# Patient Record
Sex: FEMALE | Race: WHITE | Employment: OTHER | URBAN - METROPOLITAN AREA
[De-identification: names, ages, dates, MRNs, and addresses within clinical notes are randomized per-mention and may not be internally consistent; named-entity substitution may affect disease eponyms.]

---

## 2023-08-31 ENCOUNTER — APPOINTMENT (OUTPATIENT)
Dept: CT IMAGING | Age: 88
End: 2023-08-31
Payer: MEDICARE

## 2023-08-31 ENCOUNTER — HOSPITAL ENCOUNTER (EMERGENCY)
Age: 88
Discharge: HOME | End: 2023-08-31
Attending: EMERGENCY MEDICINE
Payer: MEDICARE

## 2023-08-31 VITALS
OXYGEN SATURATION: 97 % | WEIGHT: 131 LBS | SYSTOLIC BLOOD PRESSURE: 163 MMHG | DIASTOLIC BLOOD PRESSURE: 88 MMHG | RESPIRATION RATE: 16 BRPM | HEART RATE: 84 BPM | TEMPERATURE: 98 F

## 2023-08-31 DIAGNOSIS — H81.10 BENIGN PAROXYSMAL POSITIONAL VERTIGO, UNSPECIFIED LATERALITY: Primary | ICD-10-CM

## 2023-08-31 DIAGNOSIS — H61.21 IMPACTED CERUMEN OF RIGHT EAR: ICD-10-CM

## 2023-08-31 LAB
ANION GAP SERPL CALCULATED.3IONS-SCNC: 14 MMOL/L (ref 4–16)
BASOPHILS ABSOLUTE: 0.1 K/CU MM
BASOPHILS RELATIVE PERCENT: 1.2 % (ref 0–1)
BUN SERPL-MCNC: 13 MG/DL (ref 6–23)
CALCIUM SERPL-MCNC: 9.4 MG/DL (ref 8.3–10.6)
CHLORIDE BLD-SCNC: 94 MMOL/L (ref 99–110)
CO2: 25 MMOL/L (ref 21–32)
CREAT SERPL-MCNC: 0.6 MG/DL (ref 0.6–1.1)
DIFFERENTIAL TYPE: ABNORMAL
EOSINOPHILS ABSOLUTE: 0.1 K/CU MM
EOSINOPHILS RELATIVE PERCENT: 0.6 % (ref 0–3)
GFR SERPL CREATININE-BSD FRML MDRD: >60 ML/MIN/1.73M2
GLUCOSE SERPL-MCNC: 102 MG/DL (ref 70–99)
HCT VFR BLD CALC: 40.3 % (ref 37–47)
HEMOGLOBIN: 13.7 GM/DL (ref 12.5–16)
IMMATURE NEUTROPHIL %: 0.4 % (ref 0–0.43)
LYMPHOCYTES ABSOLUTE: 1 K/CU MM
LYMPHOCYTES RELATIVE PERCENT: 12.7 % (ref 24–44)
MCH RBC QN AUTO: 30.6 PG (ref 27–31)
MCHC RBC AUTO-ENTMCNC: 34 % (ref 32–36)
MCV RBC AUTO: 90 FL (ref 78–100)
MONOCYTES ABSOLUTE: 0.7 K/CU MM
MONOCYTES RELATIVE PERCENT: 8.3 % (ref 0–4)
PDW BLD-RTO: 13.8 % (ref 11.7–14.9)
PLATELET # BLD: 305 K/CU MM (ref 140–440)
PMV BLD AUTO: 8.5 FL (ref 7.5–11.1)
POTASSIUM SERPL-SCNC: 3.3 MMOL/L (ref 3.5–5.1)
RBC # BLD: 4.48 M/CU MM (ref 4.2–5.4)
SEGMENTED NEUTROPHILS ABSOLUTE COUNT: 6 K/CU MM
SEGMENTED NEUTROPHILS RELATIVE PERCENT: 76.8 % (ref 36–66)
SODIUM BLD-SCNC: 133 MMOL/L (ref 135–145)
TOTAL IMMATURE NEUTOROPHIL: 0.03 K/CU MM
WBC # BLD: 7.8 K/CU MM (ref 4–10.5)

## 2023-08-31 PROCEDURE — 85025 COMPLETE CBC W/AUTO DIFF WBC: CPT

## 2023-08-31 PROCEDURE — 70498 CT ANGIOGRAPHY NECK: CPT

## 2023-08-31 PROCEDURE — 2580000003 HC RX 258: Performed by: EMERGENCY MEDICINE

## 2023-08-31 PROCEDURE — 70450 CT HEAD/BRAIN W/O DYE: CPT

## 2023-08-31 PROCEDURE — 99285 EMERGENCY DEPT VISIT HI MDM: CPT

## 2023-08-31 PROCEDURE — 80048 BASIC METABOLIC PNL TOTAL CA: CPT

## 2023-08-31 PROCEDURE — 6370000000 HC RX 637 (ALT 250 FOR IP): Performed by: STUDENT IN AN ORGANIZED HEALTH CARE EDUCATION/TRAINING PROGRAM

## 2023-08-31 PROCEDURE — 93005 ELECTROCARDIOGRAM TRACING: CPT | Performed by: EMERGENCY MEDICINE

## 2023-08-31 PROCEDURE — 6360000004 HC RX CONTRAST MEDICATION: Performed by: EMERGENCY MEDICINE

## 2023-08-31 PROCEDURE — 69209 REMOVE IMPACTED EAR WAX UNI: CPT

## 2023-08-31 PROCEDURE — 6370000000 HC RX 637 (ALT 250 FOR IP): Performed by: EMERGENCY MEDICINE

## 2023-08-31 RX ORDER — HYDROCHLOROTHIAZIDE 25 MG/1
25 TABLET ORAL DAILY
COMMUNITY

## 2023-08-31 RX ORDER — POTASSIUM CHLORIDE 750 MG/1
40 TABLET, FILM COATED, EXTENDED RELEASE ORAL ONCE
Status: COMPLETED | OUTPATIENT
Start: 2023-08-31 | End: 2023-08-31

## 2023-08-31 RX ORDER — ATORVASTATIN CALCIUM 40 MG/1
40 TABLET, FILM COATED ORAL DAILY
COMMUNITY

## 2023-08-31 RX ORDER — DILTIAZEM HYDROCHLORIDE EXTENDED-RELEASE TABLETS 300 MG/1
TABLET, EXTENDED RELEASE ORAL
COMMUNITY

## 2023-08-31 RX ORDER — MECLIZINE HYDROCHLORIDE 25 MG/1
25 TABLET ORAL 3 TIMES DAILY PRN
Qty: 15 TABLET | Refills: 0 | Status: SHIPPED | OUTPATIENT
Start: 2023-08-31 | End: 2023-09-07

## 2023-08-31 RX ORDER — SODIUM CHLORIDE 0.9 % (FLUSH) 0.9 %
20 SYRINGE (ML) INJECTION
Status: COMPLETED | OUTPATIENT
Start: 2023-08-31 | End: 2023-08-31

## 2023-08-31 RX ADMIN — POTASSIUM CHLORIDE 40 MEQ: 750 TABLET, FILM COATED, EXTENDED RELEASE ORAL at 19:22

## 2023-08-31 RX ADMIN — IOPAMIDOL 100 ML: 755 INJECTION, SOLUTION INTRAVENOUS at 18:51

## 2023-08-31 RX ADMIN — SODIUM CHLORIDE, PRESERVATIVE FREE 20 ML: 5 INJECTION INTRAVENOUS at 18:52

## 2023-08-31 RX ADMIN — Medication 5 DROP: at 20:13

## 2023-08-31 ASSESSMENT — LIFESTYLE VARIABLES
HOW OFTEN DO YOU HAVE A DRINK CONTAINING ALCOHOL: NEVER
HOW MANY STANDARD DRINKS CONTAINING ALCOHOL DO YOU HAVE ON A TYPICAL DAY: PATIENT DOES NOT DRINK

## 2023-08-31 ASSESSMENT — PAIN - FUNCTIONAL ASSESSMENT: PAIN_FUNCTIONAL_ASSESSMENT: NONE - DENIES PAIN

## 2023-08-31 NOTE — ED PROVIDER NOTES
Patient endorsed to me by Dr Duane Hurdle. See his note for details    45-year-old female who presented to the ED with a 2-day history of dizziness. Patient described the dizziness as vertigo. Patient also noted to have cerumen impaction in the right ear. Symptoms make worse with changing position of the head. Patient has been evaluated by previous physicians with negative labs save for low potassium for which potassium replacements were given. Patient did not receive any meclizine in the ED because patient has been asymptomatic in the ED. Patient is scheduled for a CT scan of the head and CT angiography of the head and neck. We will follow-up with patient CT scan and disposition as appropriate. 8:05 PM  CT scan of the head is unremarkable for any acute abnormality. CT angiography of the head and neck is unremarkable for any significant major vessel stenosis in the head or neck.     Patient also had the R ear cerumen impaction which was disimpacted in the ED    ED Medications  Medications   sodium chloride flush 0.9 % injection 20 mL (20 mLs IntraVENous Given 8/31/23 1852)   iopamidol (ISOVUE-370) 76 % injection 100 mL (100 mLs IntraVENous Given 8/31/23 1851)   potassium chloride (KLOR-CON) extended release tablet 40 mEq (40 mEq Oral Given 8/31/23 1922)   carbamide peroxide (DEBROX) 6.5 % otic solution 5 drop (5 drops Right Ear Given 8/31/23 2013)           Labs (if applicable):     Results for orders placed or performed during the hospital encounter of 08/31/23   CBC with Auto Differential   Result Value Ref Range    WBC 7.8 4.0 - 10.5 K/CU MM    RBC 4.48 4.2 - 5.4 M/CU MM    Hemoglobin 13.7 12.5 - 16.0 GM/DL    Hematocrit 40.3 37 - 47 %    MCV 90.0 78 - 100 FL    MCH 30.6 27 - 31 PG    MCHC 34.0 32.0 - 36.0 %    RDW 13.8 11.7 - 14.9 %    Platelets 532 179 - 562 K/CU MM    MPV 8.5 7.5 - 11.1 FL    Differential Type AUTOMATED DIFFERENTIAL     Segs Relative 76.8 (H) 36 - 66 %    Lymphocytes % 12.7 (L) 24 - 44

## 2023-08-31 NOTE — ED PROVIDER NOTES
Emergency Department Encounter    Patient: Kirit Pritchard  MRN: [id number]  : 1935  Date of Evaluation: 2023  ED Provider:  Nasim Wiley MD    MDM:    Clinical Impression:  1. Benign paroxysmal positional vertigo, unspecified laterality    2. Impacted cerumen of right ear          Triage Chief Complaint: Dizziness (Pt reports clogged ear x1 month. Started to have dizzy episodes with laying down on Tuesday night. Has continued since but mostly only when laying down. Pt called PCP to get ear checked and they instructed her to come to ED since she is also experiencing dizziness)      Patient presents with vertigo as below. I completed a structured, evidence-based clinical evaluation to screen for acute emergent condition that poses a threat to life or bodily function. Diagnostic studies/Differential diagnosis included: (with independent interpretations \"as interpreted by me\" and tests considered but not performed) patient did have cerumen impaction on exam.  There is no focal neurologic deficit. Patient's description of her symptoms are consistent with peripheral vertigo. Given that she states that she has had worsening with head position changes to include quick rotation of the head, CT head and CTA head and neck will be obtained to exclude critical stenosis of the cervical vessels. Given duration of symptoms, patient is not a candidate for thrombolysis and I do not suspect central etiology at this time. Stroke alert was not initiated. Patient has no other focal neurologic deficits to suggest CVA. Potassium was noted to be 3.3. Sodium 133. No significant hyper or hyperglycemia. No significant anemia or leukocytosis. CT head and CTA head and neck are ordered but pending. Nursing staff is clearing the cerumen impaction. Patient states that she currently has no vertigo and additional treatment is not needed at this time.   Patient was signed out to oncoming physician at shift change for Result Value Ref Range    Ventricular Rate 68 BPM    Atrial Rate 68 BPM    P-R Interval 154 ms    QRS Duration 132 ms    Q-T Interval 470 ms    QTc Calculation (Bazett) 499 ms    P Axis 67 degrees    R Axis -67 degrees    T Axis 39 degrees    Diagnosis       Normal sinus rhythm  Right bundle branch block  Left anterior fascicular block   Bifascicular block   Abnormal ECG  No previous ECGs available  Confirmed by Tyson Hubbard MD, Romario Cedeño ([id number]) on 9/1/2023 6:22:03 PM        Radiographs (if obtained):  Radiologist's Report Reviewed:  CTA HEAD NECK W CONTRAST   Final Result   No acute arterial abnormality or hemodynamically significant arterial   stenosis in the head or neck. CT HEAD WO CONTRAST   Final Result   1. No acute intracranial bleed. 2. Senescent changes. 3. White matter disease described is typical of microvascular ischemic   disease or as sequela of dysmyelinating/demyelinating processes. EKG as interpreted by me: Rate 68, sinus rhythm, left axis deviation, normal intervals with the exception of QRS prolongation at 132 ms, QTc prolongation at 499 ms, right bundle branch block morphology. Left anterior fascicular block. No acute ST-T wave changes. Comment: Please note this report has been produced using speech recognition software and may contain errors related to that system including errors in grammar, punctuation, and spelling, as well as words and phrases that may be inappropriate. Efforts were made to edit the dictations.        Janine Quick MD  09/02/23 [id number]

## 2023-09-01 LAB
EKG ATRIAL RATE: 68 BPM
EKG DIAGNOSIS: NORMAL
EKG P AXIS: 67 DEGREES
EKG P-R INTERVAL: 154 MS
EKG Q-T INTERVAL: 470 MS
EKG QRS DURATION: 132 MS
EKG QTC CALCULATION (BAZETT): 499 MS
EKG R AXIS: -67 DEGREES
EKG T AXIS: 39 DEGREES
EKG VENTRICULAR RATE: 68 BPM

## 2023-09-01 PROCEDURE — 93010 ELECTROCARDIOGRAM REPORT: CPT | Performed by: INTERNAL MEDICINE

## 2023-09-01 NOTE — ED NOTES
Dressing place on rt forearm at IV site due to infiltration, care instructions reviewed with pt and daughter     Rosmery Morris RN  08/31/23 [id number]
Patient discharge with instructions and prescriptions x1. Pt verbalized understanding.         Manisha Lynn RN  08/31/23 [id number]
normal sinus rhythm

## 2024-03-20 NOTE — DISCHARGE INSTRUCTIONS
Follow-up with primary care as soon as possible  Follow-up with neurology per referral.  Take medication as prescribed, as needed for dizziness.   Return to the ED if symptoms persist or worsen None